# Patient Record
Sex: FEMALE | Race: OTHER | HISPANIC OR LATINO
[De-identification: names, ages, dates, MRNs, and addresses within clinical notes are randomized per-mention and may not be internally consistent; named-entity substitution may affect disease eponyms.]

---

## 2020-08-02 PROBLEM — Z00.00 ENCOUNTER FOR PREVENTIVE HEALTH EXAMINATION: Status: ACTIVE | Noted: 2020-08-02

## 2020-08-17 ENCOUNTER — APPOINTMENT (OUTPATIENT)
Dept: OBGYN | Facility: CLINIC | Age: 50
End: 2020-08-17

## 2020-10-08 ENCOUNTER — APPOINTMENT (OUTPATIENT)
Dept: OBGYN | Facility: CLINIC | Age: 50
End: 2020-10-08
Payer: MEDICAID

## 2020-10-08 VITALS
WEIGHT: 134 LBS | BODY MASS INDEX: 23.74 KG/M2 | HEIGHT: 63 IN | SYSTOLIC BLOOD PRESSURE: 118 MMHG | DIASTOLIC BLOOD PRESSURE: 80 MMHG

## 2020-10-08 DIAGNOSIS — N70.92 OOPHORITIS, UNSPECIFIED: ICD-10-CM

## 2020-10-08 DIAGNOSIS — Z12.31 ENCOUNTER FOR SCREENING MAMMOGRAM FOR MALIGNANT NEOPLASM OF BREAST: ICD-10-CM

## 2020-10-08 DIAGNOSIS — R92.2 INCONCLUSIVE MAMMOGRAM: ICD-10-CM

## 2020-10-08 DIAGNOSIS — Z97.5 PRESENCE OF (INTRAUTERINE) CONTRACEPTIVE DEVICE: ICD-10-CM

## 2020-10-08 DIAGNOSIS — Z78.9 OTHER SPECIFIED HEALTH STATUS: ICD-10-CM

## 2020-10-08 DIAGNOSIS — Z01.419 ENCOUNTER FOR GYNECOLOGICAL EXAMINATION (GENERAL) (ROUTINE) W/OUT ABNORMAL FINDINGS: ICD-10-CM

## 2020-10-08 PROCEDURE — 58301 REMOVE INTRAUTERINE DEVICE: CPT

## 2020-10-08 NOTE — HISTORY OF PRESENT ILLNESS
[TextBox_4] :  (3 NVD's) who has had a Paragard IUD for 10 yrs & is here for removal. Of note, LMP 2020; menses had become very irreg prior to then; now sweats occurring day & night. M  when pt was 9 y/o - accid. Will check FSH.\par NB: pt was hospitalized for 8 days in 2020 & rx'd for TOA - had CT guided drainage & a course of abx rx. [TextBox_31] : 5-6 yrs ago by hx [FreeTextEntry1] : 7/2020

## 2020-10-08 NOTE — PHYSICAL EXAM
[Soft] : soft [Non-tender] : non-tender [Non-distended] : non-distended [No HSM] : No HSM [Labia Majora] : normal [Labia Minora] : normal [Atrophy] : atrophy [IUD String] : an IUD string was noted [Normal] : normal [Anteversion] : anteverted [Uterine Adnexae] : normal [Nl Sphincter Tone] : normal sphincter tone [FreeTextEntry3] : thyroid wnl [FreeTextEntry7] : surg scars [Enlarged ___ wks] : not enlarged [FreeTextEntry5] : Pap done [FreeTextEntry9] : no masses

## 2020-10-08 NOTE — PROCEDURE
[IUD Removal] : intrauterine device (IUD) removal [ IUD] :  IUD [Risks] : risks [Benefits] : benefits [Alternatives] : alternatives [Patient] : patient [Strings Exposed - Forceps] : strings exposed - forceps [Sent to Pathology] : specimen was placed in buffered formalin and sent for pathology [Tolerated Well] : Patient tolerated the procedure well [de-identified] : 1 arm of the IUD broke off when strings were pulled [de-identified] : A piece of the IUD remains in the uterus - disc w/ pt incl options for mngmnt - expectant vs removal [FreeTextEntry6] : Pt will need hysteroscopy w/ removal of IUD fragment

## 2020-10-12 LAB — CORE LAB BIOPSY: NORMAL

## 2020-10-13 ENCOUNTER — RESULT REVIEW (OUTPATIENT)
Age: 50
End: 2020-10-13

## 2020-10-15 LAB
CYTOLOGY CVX/VAG DOC THIN PREP: ABNORMAL
HPV HIGH+LOW RISK DNA PNL CVX: NOT DETECTED

## 2020-10-22 ENCOUNTER — APPOINTMENT (OUTPATIENT)
Dept: GASTROENTEROLOGY | Facility: CLINIC | Age: 50
End: 2020-10-22
Payer: SUBSIDIZED

## 2020-10-22 VITALS
BODY MASS INDEX: 24.1 KG/M2 | HEIGHT: 63 IN | WEIGHT: 136 LBS | SYSTOLIC BLOOD PRESSURE: 110 MMHG | TEMPERATURE: 97.8 F | HEART RATE: 72 BPM | OXYGEN SATURATION: 98 % | DIASTOLIC BLOOD PRESSURE: 60 MMHG

## 2020-10-22 DIAGNOSIS — K62.5 HEMORRHAGE OF ANUS AND RECTUM: ICD-10-CM

## 2020-10-22 PROCEDURE — 99072 ADDL SUPL MATRL&STAF TM PHE: CPT

## 2020-10-22 PROCEDURE — 99204 OFFICE O/P NEW MOD 45 MIN: CPT | Mod: 25

## 2020-10-22 NOTE — HISTORY OF PRESENT ILLNESS
[FreeTextEntry1] : Ms. Hoyos is a pleasant 50F h/o appendectomy, cholecystectomy who returns post-hospitalization.  She was hospitalized from 7/17 - 7/29/20 with abdominal pain, sepsis likely due to enterocolitis.  She was found to have a tubo-ovarian abscess s/p drainage via IR on 7/22, drain removed 7/29.\par \par On CT imaging she was found to have "findings suspicious for small bowel inflammation-enteritis involving bowel in the left abdomen" on CT 7/17.  A repeat CT on 7/22 showed a 5.7 x 5.6 x 4.0 cm collection in the cul-de-sac, with a TOA.  Was treated with abx and drainage, felt better upon discharge.\par \par She is now complaining of lower abdominal crampy pains in the mornings mainly with associated loose stool with scant rectal bleeding, after which time her pain improves. She states her pain resolves by the end of the day, however recurs in the mornings.  No black stool, no fevers or chills, no N/V.  Concern for possible IBD given her findings at that time.

## 2020-11-04 ENCOUNTER — RESULT REVIEW (OUTPATIENT)
Age: 50
End: 2020-11-04

## 2020-11-05 ENCOUNTER — RESULT REVIEW (OUTPATIENT)
Age: 50
End: 2020-11-05

## 2020-11-06 ENCOUNTER — RESULT REVIEW (OUTPATIENT)
Age: 50
End: 2020-11-06

## 2020-11-06 ENCOUNTER — APPOINTMENT (OUTPATIENT)
Dept: OBGYN | Facility: HOSPITAL | Age: 50
End: 2020-11-06

## 2020-12-08 ENCOUNTER — RESULT REVIEW (OUTPATIENT)
Age: 50
End: 2020-12-08

## 2020-12-11 ENCOUNTER — APPOINTMENT (OUTPATIENT)
Dept: GASTROENTEROLOGY | Facility: HOSPITAL | Age: 50
End: 2020-12-11

## 2020-12-23 PROBLEM — Z01.419 ENCOUNTER FOR ANNUAL ROUTINE GYNECOLOGICAL EXAMINATION: Status: RESOLVED | Noted: 2020-10-08 | Resolved: 2020-12-23
